# Patient Record
Sex: MALE | Race: WHITE | NOT HISPANIC OR LATINO | Employment: OTHER | URBAN - METROPOLITAN AREA
[De-identification: names, ages, dates, MRNs, and addresses within clinical notes are randomized per-mention and may not be internally consistent; named-entity substitution may affect disease eponyms.]

---

## 2017-01-04 PROBLEM — L02.419 CELLULITIS AND ABSCESS OF LEG: Status: ACTIVE | Noted: 2017-01-04

## 2017-01-04 PROBLEM — L03.119 CELLULITIS AND ABSCESS OF LEG: Status: ACTIVE | Noted: 2017-01-04

## 2017-04-05 PROBLEM — M54.50 CHRONIC MIDLINE LOW BACK PAIN WITHOUT SCIATICA: Status: ACTIVE | Noted: 2017-04-05

## 2017-04-05 PROBLEM — G89.29 CHRONIC MIDLINE LOW BACK PAIN WITHOUT SCIATICA: Status: ACTIVE | Noted: 2017-04-05

## 2019-02-27 PROBLEM — I10 HYPERTENSION: Status: ACTIVE | Noted: 2019-02-27

## 2020-02-18 ENCOUNTER — OFFICE VISIT (OUTPATIENT)
Dept: OTOLARYNGOLOGY | Facility: CLINIC | Age: 75
End: 2020-02-18
Payer: MEDICARE

## 2020-02-18 VITALS — WEIGHT: 315 LBS | BODY MASS INDEX: 40.43 KG/M2 | HEIGHT: 74 IN

## 2020-02-18 DIAGNOSIS — H74.8X2 ATELECTASIS OF LEFT MIDDLE EAR: Primary | ICD-10-CM

## 2020-02-18 DIAGNOSIS — R04.0 LEFT-SIDED EPISTAXIS: ICD-10-CM

## 2020-02-18 DIAGNOSIS — Z79.01 ANTICOAGULANT LONG-TERM USE: ICD-10-CM

## 2020-02-18 DIAGNOSIS — J34.89 NASAL DRYNESS: ICD-10-CM

## 2020-02-18 DIAGNOSIS — H61.22 IMPACTED CERUMEN OF LEFT EAR: ICD-10-CM

## 2020-02-18 PROCEDURE — 1101F PT FALLS ASSESS-DOCD LE1/YR: CPT | Mod: CPTII,S$GLB,, | Performed by: NURSE PRACTITIONER

## 2020-02-18 PROCEDURE — 1101F PR PT FALLS ASSESS DOC 0-1 FALLS W/OUT INJ PAST YR: ICD-10-PCS | Mod: CPTII,S$GLB,, | Performed by: NURSE PRACTITIONER

## 2020-02-18 PROCEDURE — 69210 PR REMOVAL IMPACTED CERUMEN REQUIRING INSTRUMENTATION, UNILATERAL: ICD-10-PCS | Mod: S$GLB,,, | Performed by: NURSE PRACTITIONER

## 2020-02-18 PROCEDURE — 99999 PR PBB SHADOW E&M-NEW PATIENT-LVL V: ICD-10-PCS | Mod: PBBFAC,,, | Performed by: NURSE PRACTITIONER

## 2020-02-18 PROCEDURE — 99203 PR OFFICE/OUTPT VISIT, NEW, LEVL III, 30-44 MIN: ICD-10-PCS | Mod: 25,S$GLB,, | Performed by: NURSE PRACTITIONER

## 2020-02-18 PROCEDURE — 1126F AMNT PAIN NOTED NONE PRSNT: CPT | Mod: S$GLB,,, | Performed by: NURSE PRACTITIONER

## 2020-02-18 PROCEDURE — 69210 REMOVE IMPACTED EAR WAX UNI: CPT | Mod: S$GLB,,, | Performed by: NURSE PRACTITIONER

## 2020-02-18 PROCEDURE — 1159F MED LIST DOCD IN RCRD: CPT | Mod: S$GLB,,, | Performed by: NURSE PRACTITIONER

## 2020-02-18 PROCEDURE — 99203 OFFICE O/P NEW LOW 30 MIN: CPT | Mod: 25,S$GLB,, | Performed by: NURSE PRACTITIONER

## 2020-02-18 PROCEDURE — 99999 PR PBB SHADOW E&M-NEW PATIENT-LVL V: CPT | Mod: PBBFAC,,, | Performed by: NURSE PRACTITIONER

## 2020-02-18 PROCEDURE — 1126F PR PAIN SEVERITY QUANTIFIED, NO PAIN PRESENT: ICD-10-PCS | Mod: S$GLB,,, | Performed by: NURSE PRACTITIONER

## 2020-02-18 PROCEDURE — 1159F PR MEDICATION LIST DOCUMENTED IN MEDICAL RECORD: ICD-10-PCS | Mod: S$GLB,,, | Performed by: NURSE PRACTITIONER

## 2020-02-18 RX ORDER — SODIUM CHLORIDE 0.9 % (FLUSH) 0.9 %
SYRINGE (ML) INJECTION
COMMUNITY
Start: 2020-01-04 | End: 2020-04-06 | Stop reason: CLARIF

## 2020-02-18 RX ORDER — OSELTAMIVIR PHOSPHATE 75 MG/1
CAPSULE ORAL
COMMUNITY
Start: 2020-02-05 | End: 2020-04-06 | Stop reason: CLARIF

## 2020-02-18 RX ORDER — LINAGLIPTIN 5 MG/1
5 TABLET, FILM COATED ORAL DAILY
Status: ON HOLD | COMMUNITY
Start: 2020-02-12 | End: 2020-12-17 | Stop reason: HOSPADM

## 2020-02-18 RX ORDER — HEPARIN 100 UNIT/ML
SYRINGE INTRAVENOUS
COMMUNITY
Start: 2020-01-04 | End: 2020-04-06 | Stop reason: CLARIF

## 2020-02-18 RX ORDER — INSULIN DETEMIR 100 [IU]/ML
30 INJECTION, SOLUTION SUBCUTANEOUS 2 TIMES DAILY
Status: ON HOLD | COMMUNITY
Start: 2020-01-22 | End: 2020-06-07 | Stop reason: SDUPTHER

## 2020-02-18 RX ORDER — GENTAMICIN SULFATE 3 MG/ML
SOLUTION/ DROPS OPHTHALMIC
COMMUNITY
Start: 2020-01-08 | End: 2020-04-06 | Stop reason: CLARIF

## 2020-02-18 RX ORDER — FLUCONAZOLE 2 MG/ML
INJECTION, SOLUTION INTRAVENOUS
COMMUNITY
Start: 2020-01-04 | End: 2020-04-06 | Stop reason: CLARIF

## 2020-02-18 RX ORDER — FLUTICASONE PROPIONATE 50 MCG
SPRAY, SUSPENSION (ML) NASAL
COMMUNITY
Start: 2020-02-16 | End: 2020-04-06 | Stop reason: CLARIF

## 2020-02-18 RX ORDER — INSULIN ASPART 100 [IU]/ML
0-5 INJECTION, SOLUTION INTRAVENOUS; SUBCUTANEOUS
COMMUNITY
Start: 2020-01-30

## 2020-02-18 RX ORDER — INSULIN ADMIN. SUPPLIES
INSULIN PEN (EA) SUBCUTANEOUS
COMMUNITY
Start: 2020-01-27 | End: 2021-06-23 | Stop reason: CLARIF

## 2020-02-18 RX ORDER — SOLIFENACIN SUCCINATE 10 MG/1
10 TABLET, FILM COATED ORAL DAILY
Status: ON HOLD | COMMUNITY
Start: 2020-02-06 | End: 2021-07-14 | Stop reason: HOSPADM

## 2020-02-18 NOTE — PROGRESS NOTES
Subjective:       Patient ID: Jenaro Sam is a 75 y.o. male.    Chief Complaint: Epistaxis    HPI   Patient reports infrequent left-sided epistaxis X 6 months. Most recent episode was last month. Occurs once to twice per month. He is on Flonase, as well as coumadin and Plavix. He cleans his nose with his fingers. He has nasal dryness. His BP is controlled (not taken today).     Review of Systems   Constitutional: Negative.    HENT: Positive for hearing loss (AS since youth) and nosebleeds (left, infrequent, X 6 months).    Eyes: Negative.    Respiratory: Negative.    Cardiovascular: Negative.    Gastrointestinal: Negative.    Musculoskeletal: Negative.    Skin: Negative.    Neurological: Negative.    Hematological: Negative.    Psychiatric/Behavioral: Negative.        Objective:      Physical Exam   Constitutional: He is oriented to person, place, and time. Vital signs are normal. He appears well-developed and well-nourished. He is cooperative. He does not appear ill. No distress.   Morbidly obese   HENT:   Head: Normocephalic and atraumatic.   Right Ear: Hearing, tympanic membrane, external ear and ear canal normal. Tympanic membrane is not erythematous. No middle ear effusion.   Left Ear: External ear and ear canal normal. Tympanic membrane is retracted. Tympanic membrane is not erythematous. Tympanic membrane mobility is abnormal. A middle ear effusion is present. Decreased hearing is noted.   Nose: Nose normal.   Mouth/Throat: Uvula is midline, oropharynx is clear and moist and mucous membranes are normal. He has dentures.     SEPARATE PROCEDURE IN OFFICE:   Procedure: Removal of impacted cerumen, LEFT   Pre Procedure Diagnosis: Cerumen Impaction   Post Procedure Diagnosis: Cerumen Impaction   Verbal informed consent in regards to risk of trauma to ear canal, ear drum or hearing, discomfort during procedure and/or inability to remove cerumen impaction in one session or unforeseen events or complications.   No  anesthesia.     Procedure in detail:   Ear canal visualized bilateral with appropriate size ear speculum utilizing Operating Head Binocular Otomicroscope   Utilizing the following: Ring curet, delicate alligator forceps, and/or suction cannula. The impacted cerumen of the ear canals was removed atraumatically. The TM and EAC were then inspected and found to be clear of wax. See description of TMs/EACs in PE above.   Complications: No   Condition: Improved/Good     Eyes: EOM and lids are normal. Right eye exhibits no discharge. Left eye exhibits no discharge. No scleral icterus.   Neck: Trachea normal and normal range of motion. Neck supple. No tracheal deviation present.   Cardiovascular: Normal rate.   Pulmonary/Chest: Effort normal. No stridor. No respiratory distress. He has no wheezes.   Musculoskeletal: Normal range of motion.   Neurological: He is alert and oriented to person, place, and time. He has normal strength. Coordination and gait normal.   Skin: Skin is warm, dry and intact. He is not diaphoretic. No cyanosis. No pallor.   Psychiatric: He has a normal mood and affect. His speech is normal and behavior is normal. Judgment and thought content normal. Cognition and memory are normal.   Nursing note and vitals reviewed.      Assessment:     Left cerumen impaction removed    Left TM w/severe retraction/atelectatic  Left epistaxis, infrequent  Nasal dryness  Long-term anticoagulant state  Plan:     May need PET AS    Ponaris nasal emollient BID-TID  Discontinue use of all nasal sprays as long as he is on Plavix and Coumadin.    Handouts given and discussed on epistaxis and nasal humidification protocols.   Return to clinic as needed for further episodes or any other ENT concerns.

## 2020-02-18 NOTE — PATIENT INSTRUCTIONS
"Nose Bleed Instructions:  Ayr or Ocean or other brands nasal saline gel into nose four times daily to keep moist.   Do not sleep or sit for long periods of time under a ceiling fan or other source of aggressive airflow.  Use a humidifier in bedroom or any room in your home you spend long periods of time.  Engage in only light activity. No strenuous activity. No heavy lifting or straining.   Use a stool softener to avoid straining.   No bending over at the hips. Keep nose above your heart at all times.  Sneeze with an open mouth to reduce pressure from nose.   Avoid foods or drinks hot in temperature for at least 48 hours then progress slowly.  Avoid hot steamy showers or baths for one week.  After cauterization, it is common to experience facial or teeth pain for a week or so. Tylenol as needed for pain.     Try to control risk factors for nose bleeds:   (1) Keep an eye on your blood pressure; make sure it is not running high.   (2) Avoid using ANY type of nasal spray. If you must use them, insert them gently, not too far in, avoid irritating nasal membranes especially the septum.   (3) Keep the nose moist with Ponaris nasal emollient several times a day.  (4) Limit aspirin use or blood thinners as your doctor will allow. If nasal cauterization procedure is needed, ask your prescribing provider if you can hold off on all blood thinners for 4-5 days prior to procedure.   (5) Avoid inserting anything into the nose to clean it. The only "approved" way to clean your nose is to use liberal amounts of a fine saline mist followed by very gentle blowing, repeat until clear. Keep nails cut short (no white showing).     If nose was cauterized, avoid blowing through or sneezing through that side of your nose for the next 4-5 days. You may have a dissolvable dressing in your nose after the cauterization procedure. This dressing takes a few days to dissolve. After a week, you may begin saturating nose with a fine saline mist " followed by very gentle nasal blowing. No vigorous blowing. It is common for nosebleeds to return. This may require additional cauterization procedures. This is common.     As the numbing medication wears off, you may develop pain in your teeth, face, or headache. This is normal. We advise Tylenol for pain, which should resolve after a few days.

## 2020-04-07 PROBLEM — Z86.718: Status: ACTIVE | Noted: 2020-04-07

## 2020-04-07 PROBLEM — Z20.822 SUSPECTED 2019 NOVEL CORONAVIRUS INFECTION: Status: RESOLVED | Noted: 2020-04-07 | Resolved: 2020-04-07

## 2020-04-07 PROBLEM — Z20.822 SUSPECTED COVID-19 VIRUS INFECTION: Status: ACTIVE | Noted: 2020-04-07

## 2020-04-07 PROBLEM — Z86.73 H/O: CVA (CEREBROVASCULAR ACCIDENT): Status: ACTIVE | Noted: 2020-04-07

## 2020-04-07 PROBLEM — J18.9 PNEUMONIA: Status: ACTIVE | Noted: 2020-04-07

## 2020-04-07 PROBLEM — Z20.822 SUSPECTED 2019 NOVEL CORONAVIRUS INFECTION: Status: ACTIVE | Noted: 2020-04-07

## 2020-04-07 PROBLEM — G47.33 OBSTRUCTIVE SLEEP APNEA SYNDROME: Status: ACTIVE | Noted: 2020-04-07

## 2020-04-07 PROBLEM — E66.9 OBESITY: Status: ACTIVE | Noted: 2020-04-07

## 2020-04-09 PROBLEM — J96.21 ACUTE ON CHRONIC RESPIRATORY FAILURE WITH HYPOXIA AND HYPERCAPNIA: Status: ACTIVE | Noted: 2020-04-09

## 2020-04-09 PROBLEM — E11.8 DIABETES MELLITUS TYPE 2 WITH COMPLICATIONS: Status: ACTIVE | Noted: 2020-04-09

## 2020-04-09 PROBLEM — E66.01 MORBID OBESITY DUE TO EXCESS CALORIES: Status: ACTIVE | Noted: 2020-04-09

## 2020-04-09 PROBLEM — U07.1 COVID-19 VIRUS INFECTION: Status: ACTIVE | Noted: 2020-04-09

## 2020-04-09 PROBLEM — J96.22 ACUTE ON CHRONIC RESPIRATORY FAILURE WITH HYPOXIA AND HYPERCAPNIA: Status: ACTIVE | Noted: 2020-04-09

## 2020-04-09 PROBLEM — Z71.89 ACP (ADVANCE CARE PLANNING): Status: ACTIVE | Noted: 2020-04-09

## 2020-04-10 PROBLEM — E66.2 OBESITY HYPOVENTILATION SYNDROME: Status: ACTIVE | Noted: 2020-04-09

## 2020-04-11 PROBLEM — I95.9 HYPOTENSION: Status: ACTIVE | Noted: 2020-04-11

## 2020-04-17 PROBLEM — Z71.89 GOALS OF CARE, COUNSELING/DISCUSSION: Status: RESOLVED | Noted: 2020-04-09 | Resolved: 2020-04-17

## 2020-04-17 PROBLEM — I95.9 HYPOTENSION: Status: RESOLVED | Noted: 2020-04-11 | Resolved: 2020-04-17

## 2020-04-23 ENCOUNTER — OUTPATIENT CASE MANAGEMENT (OUTPATIENT)
Dept: ADMINISTRATIVE | Facility: OTHER | Age: 75
End: 2020-04-23

## 2020-05-30 PROBLEM — Z79.01 ANTICOAGULATED ON COUMADIN: Status: ACTIVE | Noted: 2020-05-30

## 2020-05-30 PROBLEM — J44.1 ACUTE EXACERBATION OF CHRONIC OBSTRUCTIVE PULMONARY DISEASE (COPD): Status: ACTIVE | Noted: 2020-05-30

## 2020-05-30 PROBLEM — N39.0 UTI (URINARY TRACT INFECTION): Status: ACTIVE | Noted: 2020-05-30

## 2020-05-30 PROBLEM — R41.0 CONFUSION: Status: ACTIVE | Noted: 2020-05-30

## 2020-05-30 PROBLEM — L21.9 SEBORRHEIC DERMATITIS OF SCALP: Status: ACTIVE | Noted: 2020-05-30

## 2020-06-01 PROBLEM — G93.40 ACUTE ENCEPHALOPATHY: Status: ACTIVE | Noted: 2020-06-01

## 2020-06-02 PROBLEM — B95.7 STAPHYLOCOCCUS EPIDERMIDIS BACTEREMIA: Status: ACTIVE | Noted: 2020-06-02

## 2020-06-02 PROBLEM — R78.81 STAPHYLOCOCCUS EPIDERMIDIS BACTEREMIA: Status: ACTIVE | Noted: 2020-06-02

## 2020-06-04 PROBLEM — E66.01 CLASS 3 SEVERE OBESITY WITH BODY MASS INDEX (BMI) OF 45.0 TO 49.9 IN ADULT: Status: ACTIVE | Noted: 2020-04-07

## 2020-06-05 PROBLEM — N30.00 ACUTE CYSTITIS: Status: ACTIVE | Noted: 2020-05-30

## 2020-06-05 PROBLEM — B95.2 ENTEROCOCCUS UTI: Status: ACTIVE | Noted: 2020-05-30

## 2020-06-08 PROBLEM — Z79.01 LONG TERM (CURRENT) USE OF ANTICOAGULANTS: Status: ACTIVE | Noted: 2020-06-08

## 2020-06-23 ENCOUNTER — CLINICAL SUPPORT (OUTPATIENT)
Dept: AUDIOLOGY | Facility: CLINIC | Age: 75
End: 2020-06-23
Payer: MEDICARE

## 2020-06-23 ENCOUNTER — OFFICE VISIT (OUTPATIENT)
Dept: OTOLARYNGOLOGY | Facility: CLINIC | Age: 75
End: 2020-06-23
Payer: MEDICARE

## 2020-06-23 VITALS — BODY MASS INDEX: 40.43 KG/M2 | WEIGHT: 315 LBS | HEIGHT: 74 IN

## 2020-06-23 DIAGNOSIS — H61.22 LEFT EAR IMPACTED CERUMEN: ICD-10-CM

## 2020-06-23 DIAGNOSIS — H90.3 ASYMMETRIC SNHL (SENSORINEURAL HEARING LOSS): Primary | ICD-10-CM

## 2020-06-23 DIAGNOSIS — H69.92 ETD (EUSTACHIAN TUBE DYSFUNCTION), LEFT: Primary | ICD-10-CM

## 2020-06-23 PROCEDURE — 99214 PR OFFICE/OUTPT VISIT, EST, LEVL IV, 30-39 MIN: ICD-10-PCS | Mod: 25,S$GLB,, | Performed by: OTOLARYNGOLOGY

## 2020-06-23 PROCEDURE — 1101F PR PT FALLS ASSESS DOC 0-1 FALLS W/OUT INJ PAST YR: ICD-10-PCS | Mod: CPTII,S$GLB,, | Performed by: OTOLARYNGOLOGY

## 2020-06-23 PROCEDURE — 99999 PR PBB SHADOW E&M-EST. PATIENT-LVL I: ICD-10-PCS | Mod: PBBFAC,,,

## 2020-06-23 PROCEDURE — 99999 PR PBB SHADOW E&M-EST. PATIENT-LVL IV: ICD-10-PCS | Mod: PBBFAC,,, | Performed by: OTOLARYNGOLOGY

## 2020-06-23 PROCEDURE — 92567 TYMPANOMETRY: CPT | Mod: S$GLB,,, | Performed by: AUDIOLOGIST

## 2020-06-23 PROCEDURE — G0268 REMOVAL OF IMPACTED WAX MD: HCPCS | Mod: S$GLB,,, | Performed by: OTOLARYNGOLOGY

## 2020-06-23 PROCEDURE — 99214 OFFICE O/P EST MOD 30 MIN: CPT | Mod: 25,S$GLB,, | Performed by: OTOLARYNGOLOGY

## 2020-06-23 PROCEDURE — 99999 PR PBB SHADOW E&M-EST. PATIENT-LVL I: CPT | Mod: PBBFAC,,,

## 2020-06-23 PROCEDURE — 1159F PR MEDICATION LIST DOCUMENTED IN MEDICAL RECORD: ICD-10-PCS | Mod: S$GLB,,, | Performed by: OTOLARYNGOLOGY

## 2020-06-23 PROCEDURE — 92567 PR TYMPA2METRY: ICD-10-PCS | Mod: S$GLB,,, | Performed by: AUDIOLOGIST

## 2020-06-23 PROCEDURE — 92557 PR COMPREHENSIVE HEARING TEST: ICD-10-PCS | Mod: S$GLB,,, | Performed by: AUDIOLOGIST

## 2020-06-23 PROCEDURE — G0268 PR REMOVAL OF IMPACTED WAX MD: ICD-10-PCS | Mod: S$GLB,,, | Performed by: OTOLARYNGOLOGY

## 2020-06-23 PROCEDURE — 92557 COMPREHENSIVE HEARING TEST: CPT | Mod: S$GLB,,, | Performed by: AUDIOLOGIST

## 2020-06-23 PROCEDURE — 1159F MED LIST DOCD IN RCRD: CPT | Mod: S$GLB,,, | Performed by: OTOLARYNGOLOGY

## 2020-06-23 PROCEDURE — 99999 PR PBB SHADOW E&M-EST. PATIENT-LVL IV: CPT | Mod: PBBFAC,,, | Performed by: OTOLARYNGOLOGY

## 2020-06-23 PROCEDURE — 1101F PT FALLS ASSESS-DOCD LE1/YR: CPT | Mod: CPTII,S$GLB,, | Performed by: OTOLARYNGOLOGY

## 2020-06-23 NOTE — LETTER
June 23, 2020      Mishel Taylor, ASHWINI  [address] [facility] [address] [place] [address]           [facility]  [address] [place] [address]  Phone: [phone]  Fax: [phone]          Patient: Jenaro Sam   MR Number: [id number]   YOB: 1945   Date of Visit: 6/23/2020       Dear Mishel Taylor:    Thank you for referring Jenaro Sam to me for evaluation. Attached you will find relevant portions of my assessment and plan of care.    If you have questions, please do not hesitate to call me. I look forward to following Jenaro Sam along with you.    Sincerely,    Orlando Medina MD    Enclosure  CC:  No Recipients    If you would like to receive this communication electronically, please contact [email or web address] or [phone] to request more information on [facility] access.    For providers and/or their staff who would like to refer a patient to [facility], please contact us through our one-stop-shop provider referral line, [facility] , at [phone].    If you feel you have received this communication in error or would no longer like to receive these types of communications, please e-mail [email or web address]

## 2020-06-23 NOTE — PATIENT INSTRUCTIONS
The fluid in the ear is improved  You have longstanding changes on the left side related to childhood infections that cause your left hearing to be down. A tube will not help this. You may benefit from a hearing aid. We will get a hearing test to determine this.

## 2020-06-23 NOTE — PROGRESS NOTES
Subjective:       Patient ID: Jenaro aSm is a 75 y.o. male.    Chief Complaint: retracted TM    Jenaro is here for follow-up for L OME and hearing loss.   He saw Mishel 2020 for epistaxis and was noted to have left TM retraction with effusion.  He reports longstanding L hearing loss since childhood. He denies history of tubes. He denies pain.     Recent medical issues include acute on chronic respiratory failure related to COVID infection.  He has been hospitalized 3 times in the past to now half months.  Medical issues include diabetes, COPD, JULIEN, morbid obesity, history of stroke.  Medications include Coumadin, Plavix    Review of Systems   Constitutional: Negative for activity change and appetite change.   Respiratory: Negative for difficulty breathing and wheezing   Cardiovascular: Negative for chest pain.      Objective:        Constitutional:   Vital signs are normal. He appears well-developed and well-nourished.     Head:  Normocephalic and atraumatic.     Ears:    Left Ear: Decreased hearing is noted.   L cerumen impaction    Nose:  Nose normal including turbinates, nasal mucosa, sinuses and nasal septum.     Mouth/Throat  Oropharynx clear and moist without lesions or asymmetry.     Neck:  Neck normal without thyromegaly masses, asymmetry, normal tracheal structure, crepitus, and tenderness.         Tests / Results:  Jenaro Sam   [id number]  :1945  Procedure date:2020  Patient's medications, allergies, past medical, surgical, social and family histories were reviewed and updated as appropriate.    Diagnosis: Cerumen impaction    Procedure: Left removal of cerumen requiring instrumentation    Indications:  Jenaro is a 75 y.o. male with the history of present illness as discussed in the clinic note from today.  During this unrelated patient encounter I observed impacted cerumen.  The otoscopic examination of the tympanic membrane was not possible due to copious cerumen impaction.       Procedure in detail: The patient was in agreement with the examination and debridement of the ears. Removal of the cerumen required a high level of expertise and use of an operating microscope and multiple micro-instruments.     With the patient in the supine position, we used the operating microscope to examine both ears with the appropriate sized ear speculum.  A variety of sterile, micro-instruments were utilized to remove the cerumen atraumatically.  I performed the procedure which required a significant amount of time and effort. The tympanic membrane was then well visualized. The patient tolerated the procedure well and there were no complications.    Performed by: Orlando Medina. MD    Findings:   Right ear had no wax, the EAC was normal, and the tympanic membrane was intact with no evidence of middle ear fluid..    Left ear had significant wax, the EAC was normal, and the tympanic membrane was intact with severe posterior retraction. The cerumen was thick and dry.                Assessment:       1. ETD (Eustachian tube dysfunction), left    2. Left ear impacted cerumen          Plan:         Reassured effusion has resolved. Has longstanding eustachian tube dysfunction on this side with resultant retraction of the entire tympanic membrane with deep posterior retraction pocket.  He does not have evidence of a cholesteatoma.  We did discuss hearing aid on the left side as the best option for rehabilitation, if he is interested.  Would defer tympanostomy tube at this time     Fu as needed

## 2020-09-02 PROBLEM — N39.0 UTI (URINARY TRACT INFECTION): Status: ACTIVE | Noted: 2020-09-02

## 2020-10-30 PROBLEM — N17.9 AKI (ACUTE KIDNEY INJURY): Status: ACTIVE | Noted: 2020-10-30

## 2020-10-30 PROBLEM — F01.50 VASCULAR DEMENTIA: Status: ACTIVE | Noted: 2020-10-30

## 2020-12-10 PROBLEM — A41.9 SEPSIS SECONDARY TO UTI: Status: ACTIVE | Noted: 2020-05-30

## 2020-12-12 PROBLEM — J96.02 ACUTE RESPIRATORY FAILURE WITH HYPOXIA AND HYPERCAPNIA: Status: ACTIVE | Noted: 2020-12-12

## 2020-12-12 PROBLEM — J96.01 ACUTE RESPIRATORY FAILURE WITH HYPOXIA AND HYPERCAPNIA: Status: ACTIVE | Noted: 2020-12-12

## 2020-12-14 PROBLEM — Z75.8 DISCHARGE PLANNING ISSUES: Status: ACTIVE | Noted: 2020-12-14

## 2020-12-26 PROBLEM — N30.01 ACUTE CYSTITIS WITH HEMATURIA: Status: ACTIVE | Noted: 2020-12-26

## 2020-12-26 PROBLEM — R79.89 TROPONIN LEVEL ELEVATED: Status: ACTIVE | Noted: 2020-12-26

## 2020-12-26 PROBLEM — J96.02 ACUTE HYPERCAPNIC RESPIRATORY FAILURE: Status: ACTIVE | Noted: 2020-12-26

## 2021-01-02 PROBLEM — A49.8 CLOSTRIDIUM DIFFICILE INFECTION: Status: ACTIVE | Noted: 2021-01-02

## 2021-02-08 PROBLEM — J96.22 ACUTE ON CHRONIC RESPIRATORY FAILURE WITH HYPERCAPNIA: Status: ACTIVE | Noted: 2021-02-08

## 2021-02-08 PROBLEM — R41.82 ALTERED MENTAL STATUS: Status: ACTIVE | Noted: 2021-02-08

## 2021-02-08 PROBLEM — R33.9 URINARY RETENTION: Status: ACTIVE | Noted: 2021-02-08

## 2021-02-12 PROBLEM — E87.6 HYPOKALEMIA: Status: ACTIVE | Noted: 2021-02-12

## 2021-02-12 PROBLEM — I48.0 PAROXYSMAL A-FIB: Status: ACTIVE | Noted: 2021-02-12

## 2021-02-12 PROBLEM — E83.39 HYPOPHOSPHATEMIA: Status: ACTIVE | Noted: 2021-02-12

## 2021-02-12 PROBLEM — I45.5 SINUS PAUSE: Status: ACTIVE | Noted: 2021-02-12

## 2021-02-14 PROBLEM — I50.32 CHRONIC DIASTOLIC HF (HEART FAILURE): Status: ACTIVE | Noted: 2021-02-14

## 2021-06-07 ENCOUNTER — IMMUNIZATION (OUTPATIENT)
Dept: FAMILY MEDICINE | Facility: CLINIC | Age: 76
End: 2021-06-07
Payer: MEDICARE

## 2021-06-07 DIAGNOSIS — Z23 NEED FOR VACCINATION: Primary | ICD-10-CM

## 2021-06-07 PROCEDURE — 91300 COVID-19, MRNA, LNP-S, PF, 30 MCG/0.3 ML DOSE VACCINE: CPT | Mod: PBBFAC | Performed by: FAMILY MEDICINE

## 2021-06-17 ENCOUNTER — TELEPHONE (OUTPATIENT)
Dept: DERMATOLOGY | Facility: CLINIC | Age: 76
End: 2021-06-17

## 2021-06-23 PROBLEM — J44.9 COPD (CHRONIC OBSTRUCTIVE PULMONARY DISEASE): Status: ACTIVE | Noted: 2021-06-23

## 2021-06-23 PROBLEM — R55 SYNCOPE: Status: ACTIVE | Noted: 2021-06-23

## 2021-06-23 PROBLEM — G93.41 ENCEPHALOPATHY, METABOLIC: Status: ACTIVE | Noted: 2021-06-23

## 2021-06-26 PROBLEM — R50.9 FEVER: Status: ACTIVE | Noted: 2021-06-26

## 2021-06-26 PROBLEM — R53.81 DEBILITY: Status: ACTIVE | Noted: 2021-06-26

## 2021-06-28 PROBLEM — L03.115 CELLULITIS OF RIGHT LOWER EXTREMITY: Status: ACTIVE | Noted: 2017-01-04

## 2021-07-01 PROBLEM — J96.00 ACUTE RESPIRATORY FAILURE: Status: ACTIVE | Noted: 2021-07-01

## 2021-07-01 PROBLEM — J96.00 ACUTE RESPIRATORY FAILURE: Status: RESOLVED | Noted: 2021-07-01 | Resolved: 2021-07-01

## 2021-07-03 PROBLEM — D68.9 COAGULOPATHY: Status: ACTIVE | Noted: 2021-07-03

## 2021-07-05 PROBLEM — Z71.89 ADVANCE CARE PLANNING: Status: RESOLVED | Noted: 2020-04-09 | Resolved: 2021-07-05

## 2021-07-05 PROBLEM — U07.1 COVID-19 VIRUS INFECTION: Status: RESOLVED | Noted: 2020-04-09 | Resolved: 2021-07-05

## 2021-07-05 PROBLEM — D68.9 COAGULOPATHY: Status: RESOLVED | Noted: 2021-07-03 | Resolved: 2021-07-05

## 2021-07-06 PROBLEM — R06.03 RESPIRATORY DISTRESS: Status: ACTIVE | Noted: 2021-07-06

## 2021-07-08 PROBLEM — D64.9 ANEMIA, UNSPECIFIED: Status: ACTIVE | Noted: 2021-07-08

## 2021-07-08 PROBLEM — J96.02 ACUTE RESPIRATORY ACIDOSIS: Status: ACTIVE | Noted: 2021-07-08

## 2021-07-11 PROBLEM — N30.00 ACUTE CYSTITIS: Status: ACTIVE | Noted: 2020-12-26

## 2021-12-07 PROBLEM — Z71.89 ACP (ADVANCE CARE PLANNING): Status: ACTIVE | Noted: 2021-12-07

## 2022-11-16 PROBLEM — E72.20 HYPERAMMONEMIA: Status: ACTIVE | Noted: 2022-01-01

## 2022-11-16 PROBLEM — E87.5 HYPERKALEMIA: Status: ACTIVE | Noted: 2022-01-01

## 2022-11-20 PROBLEM — E72.20 HYPERAMMONEMIA: Status: RESOLVED | Noted: 2022-01-01 | Resolved: 2022-01-01
